# Patient Record
Sex: MALE | Race: NATIVE HAWAIIAN OR OTHER PACIFIC ISLANDER
[De-identification: names, ages, dates, MRNs, and addresses within clinical notes are randomized per-mention and may not be internally consistent; named-entity substitution may affect disease eponyms.]

---

## 2018-08-29 ENCOUNTER — HOSPITAL ENCOUNTER (EMERGENCY)
Dept: HOSPITAL 47 - EC | Age: 83
Discharge: HOME | End: 2018-08-29
Payer: MEDICARE

## 2018-08-29 VITALS — SYSTOLIC BLOOD PRESSURE: 156 MMHG | DIASTOLIC BLOOD PRESSURE: 74 MMHG | HEART RATE: 57 BPM

## 2018-08-29 VITALS — RESPIRATION RATE: 18 BRPM | TEMPERATURE: 98.5 F

## 2018-08-29 DIAGNOSIS — I10: ICD-10-CM

## 2018-08-29 DIAGNOSIS — Z85.820: ICD-10-CM

## 2018-08-29 DIAGNOSIS — M25.551: ICD-10-CM

## 2018-08-29 DIAGNOSIS — Z53.29: ICD-10-CM

## 2018-08-29 DIAGNOSIS — M43.17: ICD-10-CM

## 2018-08-29 DIAGNOSIS — Z96.653: ICD-10-CM

## 2018-08-29 DIAGNOSIS — M54.5: Primary | ICD-10-CM

## 2018-08-29 DIAGNOSIS — G89.29: ICD-10-CM

## 2018-08-29 DIAGNOSIS — Z79.899: ICD-10-CM

## 2018-08-29 DIAGNOSIS — Z87.891: ICD-10-CM

## 2018-08-29 PROCEDURE — 73502 X-RAY EXAM HIP UNI 2-3 VIEWS: CPT

## 2018-08-29 PROCEDURE — 72100 X-RAY EXAM L-S SPINE 2/3 VWS: CPT

## 2018-08-29 PROCEDURE — 99283 EMERGENCY DEPT VISIT LOW MDM: CPT

## 2018-08-29 NOTE — XR
EXAMINATION TYPE: XR Hip RT and AP Pelvis

 

DATE OF EXAM: 8/29/2018

 

CLINICAL HISTORY: Pelvic and right hip pain.

 

TECHNIQUE: A single AP view of the pelvis is obtained. Two views of the right hip are obtained.  

 

COMPARISON: None.

 

FINDINGS:  

There is no acute fracture/dislocation evident in the pelvis.  The hip and sacroiliac joints appear s
ymmetric and unremarkable.  The overlying soft tissue appears unremarkable.Two views of right hip horace
w no acute fracture or dislocation.  No focal lytic or sclerotic lesion seen in the proximal right fe
mur.  The overlying soft tissue is unremarkable. 

 

IMPRESSION:  There is no acute fracture or dislocation in the pelvis or right hip.

## 2018-08-29 NOTE — XR
EXAMINATION TYPE: XR lumbar spine 2 or 3V

 

DATE OF EXAM: 8/29/2018

 

CLINICAL HISTORY: pain

 

TECHNIQUE: Three views of the lumbar spine are submitted.

 

COMPARISON: None.

 

FINDINGS:  

There is grade 2 anterolisthesis L5 on S1 related to bilateral spondylolysis. Grade 1 anterolisthesis
 L4 and L5 measuring 4.4 mm related to severe facet joint arthropathy. There is a moderate to severe 
narrowing at the L3-4 through L5-S1. No compression fractures are seen.

 

IMPRESSION:  

Anterolisthesis L4-5 and L5-S1 as discussed. L5-S1 spondylolysis.

## 2018-08-29 NOTE — ED
Lower Extremity Injury HPI





- General


Chief Complaint: Extremity Injury, Lower


Stated Complaint: Back Pain


Time Seen by Provider: 08/29/18 14:59


Source: patient


Mode of arrival: ambulatory


Limitations: no limitations





- History of Present Illness


Initial Comments: 


This a 89-year-old male past medical history of melanoma, murmur, hypertension, 

osteoporosis who presents today for chief complaint of right hip pain times 

years.  Patient states that he has had right hip pain for years that has been 

worsening for the past 2 weeks the pain increases to a 10/10 when ambulate. He 

states that it is sharp in nature.  Patient is able to fully weight-bear and 

ambulate.  Patient denies any falls or trauma recently.  Patient denies any 

redness of the overlying joint, or decreased range of motion.  Patient denies 

any numbness, tingling, loss sensation, muscle weakness of the lower extremities

, paresthesias, coolness of the extremity, pallor, swelling of the joint, fever

, chills or night sweats . Pt does have an orthopedic surgeon that he follows 

Dr. Gleason however he states he is not doing anything for the hip pain.  In 

addition upon review of systems patient does admit to mild low back pain.  He 

states that there is no change in the low back pain.  Patient denies any loss 

of bowel bladder control, urinary retention.  He is prescribed Ultram for pain 

management from primary care physician, which she states is minimally helping. 

Remainder ROS (-). 








- Related Data


 Home Medications











 Medication  Instructions  Recorded  Confirmed


 


Lisinopril [Prinivil] 20 mg PO BID 08/03/15 08/03/15


 


amLODIPine [Norvasc] 10 mg PO DAILY 08/03/15 08/03/15











 Allergies











Allergy/AdvReac Type Severity Reaction Status Date / Time


 


No Known Allergies Allergy   Verified 08/29/18 14:25














Review of Systems


ROS Statement: 


Those systems with pertinent positive or pertinent negative responses have been 

documented in the HPI.





ROS Other: All systems not noted in ROS Statement are negative.


Constitutional: Denies: fever, chills


Eyes: Denies: vision change


ENT: Denies: ear pain, throat pain


Respiratory: Denies: cough, dyspnea


Cardiovascular: Denies: chest pain, palpitations


Gastrointestinal: Denies: abdominal pain, nausea, vomiting, diarrhea, 

constipation


Genitourinary: Denies: urgency, dysuria, frequency, hematuria


Musculoskeletal: Reports: back pain, arthralgia.  Denies: joint swelling, 

myalgia


Skin: Denies: rash, lesions


Neurological: Denies: headache, weakness, numbness, paresthesias, confusion, 

abnormal gait





Past Medical History


Past Medical History: Cancer, COPD, Eye Disorder, Hypertension


Additional Past Medical History / Comment(s): heart murmer.  , melanoma,-

SEVERAL SPOTS TO FACE ONGOING.  rectal cancer-.  MACULAR DEGENERATION


History of Any Multi-Drug Resistant Organisms: None Reported


Past Surgical History: Appendectomy, Bowel Resection, Cholecystectomy, 

Orthopedic Surgery


Additional Past Surgical History / Comment(s): left  rotator cuff,.  [place] 

arthroscopic knee.  ,left cataract,.  colostomy/reversal.  COLONOSCOPY


Past Anesthesia/Blood Transfusion Reactions: No Reported Reaction


Smoking Status: Former smoker





- Past Family History


  ** Mother


Family Medical History: No Reported History





General Exam





- General Exam Comments


Initial Comments: 


General:  The patient is awake and alert, in no distress, and does not appear 

acutely ill. 


Eye:  Pupils are equal, round and reactive to light, extra-ocular movements are 

intact.  No nystagmus.  There is normal conjunctiva bilaterally.  No signs of 

icterus.  


Cardiovascular:  There is a regular rate and rhythm. No murmur, rub or gallop 

is appreciated.


Respiratory:  Lungs are clear to auscultation, respirations are non-labored, 

breath sounds are equal.  No wheezes, stridor, rales, or rhonchi.


Musculoskeletal: Upon inspection of the right hip there is no obvious defects, 

no overlying erythema or swelling.  There are no rashes or ecchymosis.  Pt able 

to forward flex, hyperextend at the lumbar spine with mild discomfort. (+) SLR b

/l. R>L.  Patient has no tenderness midline to palpation of the lumbar spine.  

However he admits to paravertebral tenderness.  Patient is able to fully range 

at the right hip without difficulty, including flexion, extension, internal and 

external rotation he complains of pain with these movements.  Patient has full 

strength at the thigh hip, knee and ankle of the lower extremities equally 

bilaterally.  No evidence of weakness. No pain with log roll of legs b/l, 

pelvic compression. Sensation intact of the lower extremities from the hip to 

the toes patient states this is equal, no saddle paresthesias. DP pulses equal 

bilaterally 2+.  +2 out of 5 deep tendon reflexes, no evidence of 

fasciculations or myoclonus.  


Neurological:  A&O x 3. CN II-XII intact, There are no obvious motor or sensory 

deficits. Coordination appears grossly intact. Speech is normal.


Skin:  Skin is warm and dry and no rashes or lesions are noted. 


Psychiatric:  Cooperative, appropriate mood & affect, normal judgment.  





Limitations: no limitations





Course


 Vital Signs











  08/29/18





  14:23


 


Temperature 98.5 F


 


Pulse Rate 60


 


Respiratory 18





Rate 


 


Blood Pressure 129/71


 


O2 Sat by Pulse 96





Oximetry 














Medical Decision Making





- Medical Decision Making


89yo male with atraumatic right hip and low back pain. Pt has extensive history 

of osteoarthritis according to his wife.  Patient states that he has had this 

low back pain and right leg pain for years however isn't worsening for the past 

2 weeks.  Patient has no signs of cauda equina or neurovascular, otherwise at 

this time.  I observe patient walking to the bathroom, he ambulated without 

difficulty.  He is weightbearing on his right hip.  X-rays the right hip and 

pelvis were obtained revealing no evidence suspicious for fracture or 

dislocation.  No noted osteophytes.  I was suspicious of a lumbar radiculopathy 

causing the right hip pain since or x-ray of the lumbar spine.  This revealed 

Anterolisthesis L4-L5. L5-S1, and there is moderate to severe narrowing at L3 

through L4.  No compression fracture seen.  At this time I feel that the patient

's right hip pain is radicular in nature.  Case discussed with Dr. Ashford.  We 

feel patient benefit from orthopedic surgery follow-up.  Patient was instructed 

to continue taking his tramadol as prescribed by primary care provider.  For 

pain management.  Patient was educated on treatment of low back pain as well as 

the signs and symptoms of cauda equina.  It was stressed the patient if he 

experiences any of the symptoms he is to return to emergency department, both 

patient and wife verbally confirmed understanding.  Both patient and wife deny 

questions at this time.  Patient discharged in stable condition.








Disposition


Clinical Impression: 


 Radicular pain of right lower extremity, Acute exacerbation of chronic low 

back pain, Right hip pain





Disposition: HOME SELF-CARE


Condition: Good


Instructions:  Lumbar Radiculopathy (ED)


Additional Instructions: 


Please use medication as discussed.  Please follow-up with orthopedic surgery 

in the next 1-2 days.  Please return to emergency room if the symptoms increase 

or worsen or for any other concerns, as discussed.


Is patient prescribed a controlled substance at d/c from ED?: No


Referrals: 


Barron Gama DO [Primary Care Provider] - 1-2 days


Jaspal Milton MD [STAFF PHYSICIAN] - 1-2 days


Time of Disposition: 16:48